# Patient Record
Sex: FEMALE | Race: WHITE | NOT HISPANIC OR LATINO | Employment: OTHER | URBAN - NONMETROPOLITAN AREA
[De-identification: names, ages, dates, MRNs, and addresses within clinical notes are randomized per-mention and may not be internally consistent; named-entity substitution may affect disease eponyms.]

---

## 2022-02-24 DIAGNOSIS — R60.0 LOCALIZED EDEMA: ICD-10-CM

## 2022-02-24 DIAGNOSIS — R79.89 OTHER SPECIFIED ABNORMAL FINDINGS OF BLOOD CHEMISTRY: ICD-10-CM

## 2022-02-24 DIAGNOSIS — I83.813 VARICOSE VEINS OF BILATERAL LOWER EXTREMITIES WITH PAIN: ICD-10-CM

## 2025-03-01 ENCOUNTER — OFFICE VISIT (OUTPATIENT)
Dept: URGENT CARE | Facility: CLINIC | Age: 85
End: 2025-03-01
Payer: COMMERCIAL

## 2025-03-01 VITALS
BODY MASS INDEX: 26.68 KG/M2 | DIASTOLIC BLOOD PRESSURE: 86 MMHG | RESPIRATION RATE: 32 BRPM | OXYGEN SATURATION: 94 % | HEIGHT: 62 IN | SYSTOLIC BLOOD PRESSURE: 146 MMHG | HEART RATE: 104 BPM | WEIGHT: 145 LBS

## 2025-03-01 DIAGNOSIS — H57.89 EYE REDNESS: Primary | ICD-10-CM

## 2025-03-01 PROCEDURE — S9088 SERVICES PROVIDED IN URGENT: HCPCS

## 2025-03-01 PROCEDURE — 99213 OFFICE O/P EST LOW 20 MIN: CPT

## 2025-03-01 RX ORDER — ERYTHROMYCIN 5 MG/G
0.5 OINTMENT OPHTHALMIC EVERY 6 HOURS SCHEDULED
Qty: 28 G | Refills: 0 | Status: SHIPPED | OUTPATIENT
Start: 2025-03-01 | End: 2025-03-01

## 2025-03-01 RX ORDER — METHIMAZOLE 5 MG/1
0.5 TABLET ORAL DAILY
COMMUNITY
Start: 2025-02-03

## 2025-03-01 RX ORDER — LOSARTAN POTASSIUM 100 MG/1
1 TABLET ORAL DAILY
COMMUNITY
Start: 2024-12-19

## 2025-03-01 RX ORDER — ASPIRIN 81 MG/1
81 TABLET ORAL DAILY
COMMUNITY

## 2025-03-01 NOTE — PROGRESS NOTES
Name: Suly Hdz      : 1940      MRN: [id number]  Encounter Provider: DIOMEDES Kaur  Encounter Date: 3/1/2025   Encounter department: [facility] - [place], [place]  :  Assessment & Plan  Eye redness    Patient declining eye drops.  Discussed they can trial ophthalmic ointment but unsure if this will treat sympt.  F/u with PCP & eye doctor as needed.    The benefits, risks and alternatives to the treatment plan were discussed at this visit. Patient was advised of common adverse effects of any medical therapies prescribed. All questions were answered and discussed with the patient and any accompanying family members or caretakers.     Orders:    bacitracin-polymyxin b (POLYSPORIN) ophthalmic ointment; Administer to both eyes 4 (four) times a day for 7 days        History of Present Illness     Suly Hdz is a 84 y.o. female who presents with eye redness x 1 week  +drainage; no visual changes    [place] - accompanied by dghter  No recent illness  Denies eye pain    History obtained from: patient    Review of Systems   Constitutional:  Negative for chills, fatigue and fever.   HENT:  Negative for congestion, ear pain, facial swelling, hearing loss, rhinorrhea, sinus pressure, sneezing, sore throat and trouble swallowing.    Eyes:  Positive for discharge and redness. Negative for pain and visual disturbance.   Respiratory:  Negative for cough, chest tightness, shortness of breath and wheezing.    Cardiovascular:  Negative for chest pain and palpitations.   Gastrointestinal:  Negative for abdominal pain, diarrhea, nausea and vomiting.   Genitourinary:  Negative for dysuria, flank pain, hematuria and pelvic pain.   Musculoskeletal:  Negative for arthralgias, back pain and myalgias.   Skin:  Negative for color change and rash.   Neurological:  Negative for dizziness, seizures, syncope, weakness, light-headedness and headaches.   Psychiatric/Behavioral:  Negative for confusion,  "hallucinations and sleep disturbance. The patient is not nervous/anxious.    All other systems reviewed and are negative.    Medical History Reviewed by provider this encounter:  Allergies  Meds  Problems  Med Hx  Surg Hx  Fam Hx     .  Current Outpatient Medications on File Prior to Visit   Medication Sig Dispense Refill    aspirin (ECOTRIN LOW STRENGTH) 81 mg EC tablet Take 81 mg by mouth daily      Cholecalciferol 50 MCG (2000 UT) CAPS Take 2,000 Units by mouth daily      losartan (COZAAR) 100 MG tablet Take 1 tablet by mouth in the morning      methimazole (TAPAZOLE) 5 mg tablet Take 0.5 tablets by mouth daily       No current facility-administered medications on file prior to visit.      Social History     Tobacco Use    Smoking status: Former     Types: Cigarettes    Smokeless tobacco: Not on file   Vaping Use    Vaping status: Never Used   Substance and Sexual Activity    Alcohol use: Never    Drug use: Not on file    Sexual activity: Not on file        Objective   /86   Pulse 104   Resp (!) 32   Ht 5' 2\" (1.575 m)   Wt 65.8 kg (145 lb)   SpO2 94%   BMI 26.52 kg/m²      Physical Exam  Vitals and nursing note reviewed.   Constitutional:       General: She is not in acute distress.     Appearance: She is well-developed.   HENT:      Head: Normocephalic and atraumatic.      Right Ear: Hearing and tympanic membrane normal.      Left Ear: Hearing and tympanic membrane normal.      Nose: Nose normal.      Mouth/Throat:      Mouth: Mucous membranes are moist.      Dentition: Normal dentition.      Tongue: No lesions.      Pharynx: Oropharynx is clear. Uvula midline. No oropharyngeal exudate.      Tonsils: No tonsillar exudate.   Eyes:      Extraocular Movements: Extraocular movements intact.      Conjunctiva/sclera:      Right eye: Right conjunctiva is injected.      Left eye: Left conjunctiva is injected.      Comments: L>R   Neck:      Vascular: No carotid bruit or JVD.   Cardiovascular:      " Rate and Rhythm: Normal rate and regular rhythm.      Heart sounds: S1 normal and S2 normal. No murmur heard.  Pulmonary:      Effort: Pulmonary effort is normal. No tachypnea or respiratory distress.      Breath sounds: Normal breath sounds and air entry. No decreased breath sounds.   Chest:      Chest wall: No deformity or tenderness.   Abdominal:      General: Abdomen is flat. Bowel sounds are normal. There is no distension.      Palpations: Abdomen is soft.      Tenderness: There is no abdominal tenderness. There is no right CVA tenderness, left CVA tenderness or guarding.   Musculoskeletal:         General: No swelling.      Cervical back: Full passive range of motion without pain and neck supple.      Right lower leg: No edema.      Left lower leg: No edema.   Lymphadenopathy:      Cervical: No cervical adenopathy.   Skin:     General: Skin is warm and dry.      Capillary Refill: Capillary refill takes less than 2 seconds.      Findings: No rash.   Neurological:      Mental Status: She is alert and oriented to person, place, and time.      Cranial Nerves: Cranial nerves 2-12 are intact.      Sensory: Sensation is intact.      Motor: Motor function is intact.      Coordination: Coordination is intact.      Gait: Gait is intact.   Psychiatric:         Mood and Affect: Mood normal.         Behavior: Behavior is cooperative.

## 2025-07-17 ENCOUNTER — OFFICE VISIT (OUTPATIENT)
Dept: URGENT CARE | Facility: CLINIC | Age: 85
End: 2025-07-17
Payer: COMMERCIAL

## 2025-07-17 VITALS
TEMPERATURE: 98.2 F | SYSTOLIC BLOOD PRESSURE: 130 MMHG | BODY MASS INDEX: 26.68 KG/M2 | DIASTOLIC BLOOD PRESSURE: 80 MMHG | RESPIRATION RATE: 18 BRPM | WEIGHT: 145 LBS | HEIGHT: 62 IN | OXYGEN SATURATION: 92 % | HEART RATE: 102 BPM

## 2025-07-17 DIAGNOSIS — L03.115 CELLULITIS OF RIGHT LOWER EXTREMITY: Primary | ICD-10-CM

## 2025-07-17 PROCEDURE — 99212 OFFICE O/P EST SF 10 MIN: CPT | Performed by: PHYSICIAN ASSISTANT

## 2025-07-17 PROCEDURE — S9088 SERVICES PROVIDED IN URGENT: HCPCS | Performed by: PHYSICIAN ASSISTANT

## 2025-07-17 RX ORDER — CEFUROXIME AXETIL 250 MG/1
250 TABLET ORAL EVERY 12 HOURS SCHEDULED
Qty: 20 TABLET | Refills: 0 | Status: SHIPPED | OUTPATIENT
Start: 2025-07-17 | End: 2025-07-27

## 2025-07-17 RX ORDER — DOXYCYCLINE 100 MG/1
100 CAPSULE ORAL DAILY
Qty: 10 CAPSULE | Refills: 0 | Status: SHIPPED | OUTPATIENT
Start: 2025-07-17 | End: 2025-07-27

## 2025-07-17 RX ORDER — LOSARTAN POTASSIUM AND HYDROCHLOROTHIAZIDE 12.5; 5 MG/1; MG/1
1 TABLET ORAL DAILY
COMMUNITY
Start: 2025-06-19

## 2025-07-17 NOTE — PROGRESS NOTES
[facility]  Name: Suly Hdz      : 1940      MRN: [id number]  Encounter Provider: Waleska Zamora PA-C  Encounter Date: 2025   Encounter department: [facility] [place]  :  Assessment & Plan  Cellulitis of right lower extremity    Orders:    cefuroxime (CEFTIN) 250 mg tablet; Take 1 tablet (250 mg total) by mouth every 12 (twelve) hours for 10 days    doxycycline monohydrate (MONODOX) 100 mg capsule; Take 1 capsule (100 mg total) by mouth in the morning for 10 doses    Advised patient to take the antibiotics as prescribed and proceed to the emergency if symptoms gotten worse.  Patient and her daughter verbalized understanding.    Patient Instructions  Take antibiotics as prescribed  Keep the wound dry and clean  Watch for worsening symptoms  Follow up with PCP in 3-5 days.  Proceed to  ER if symptoms worsen.    If tests are performed, our office will contact you with results only if changes need to made to the care plan discussed with you at the visit. You can review your full results on [facility].    Chief Complaint:   Chief Complaint   Patient presents with    Leg Pain     Pt c/o right leg pain x2 months. Pt was seen in the Er for this and given antibiotics . Pt thinks she has cellulitis.      History of Present Illness   Patient presents with right leg pain ongoing for approximately 2 months.  She was evaluated in the emergency department on 2025 for the same complaint and was described a course of antibiotics at that time.  She reports persistent discomfort despite prior treatment.  Denies any recent trauma, fever or drainage.  No significant improvement in symptoms noted since the ER visit.      History obtained from: patient's Significant Other    Review of Systems   Skin:  Positive for wound.     Past Medical History   Past Medical History[1]  Past Surgical History[2]  Family History[3]  she reports that she has quit smoking. Her smoking  "use included cigarettes. She has never used smokeless tobacco. She reports that she does not drink alcohol and does not use drugs.  Current Outpatient Medications   Medication Instructions    aspirin (ECOTRIN LOW STRENGTH) 81 mg, Daily    cefuroxime (CEFTIN) 250 mg, Oral, Every 12 hours scheduled    Cholecalciferol 2,000 Units, Oral, Daily    doxycycline monohydrate (MONODOX) 100 mg, Oral, Daily    losartan (COZAAR) 100 MG tablet 1 tablet, Oral, Daily    losartan-hydrochlorothiazide (HYZAAR) 50-12.5 mg per tablet 1 tablet, Daily    methimazole (TAPAZOLE) 5 mg tablet 0.5 tablets, Oral, Daily   Allergies[4]     Objective   /80   Pulse 102   Temp 98.2 °F (36.8 °C)   Resp 18   Ht 5' 2\" (1.575 m)   Wt 65.8 kg (145 lb)   SpO2 92%   BMI 26.52 kg/m²      Physical Exam  Vitals and nursing note reviewed.   Constitutional:   General: She is awake. She is not in acute distress.  Appearance: She is overweight. She is not ill-appearing, toxic-appearing or diaphoretic.   HENT:   Head: Normocephalic and atraumatic.   Mouth/Throat:   Mouth: Mucous membranes are moist.   Eyes:   General: Lids are normal. Vision grossly intact.   Extraocular Movements: Extraocular movements intact.   Conjunctiva/sclera: Conjunctivae normal.   Pupils: Pupils are equal, round, and reactive to light.   Cardiovascular:   Rate and Rhythm: Regular rhythm. Tachycardia present.   Pulmonary:   Effort: Pulmonary effort is normal. No accessory muscle usage.   Breath sounds: Normal breath sounds and air entry.   Abdominal:   Palpations: Abdomen is soft.   Musculoskeletal:   General: Swelling present.   Cervical back: Full passive range of motion without pain, normal range of motion and neck supple.   Skin:  General: Skin is warm.   Capillary Refill: Capillary refill takes less than 2 seconds.       Comments: Clear weeping ulcer anterior RLE with general redness in lower legs R>L. Distally warm pink and well perfused in feet     Administrative " "Statements   I have spent a total time of 10 minutes in caring for this patient on the day of the visit/encounter including Prognosis, Risks and benefits of tx options, Patient and family education, Importance of tx compliance, Documenting in the medical record, Reviewing/placing orders in the medical record (including tests, medications, and/or procedures), and Obtaining or reviewing history  .Portions of the record may have been created with voice recognition software.  Occasional wrong word or \"sound a like\" substitutions may have occurred due to the inherent limitations of voice recognition software.  Read the chart carefully and recognize, using context, where substitutions have occurred.       [1]   Past Medical History:  Diagnosis Date    COPD (chronic obstructive pulmonary disease) (HCC)     Hypertension    [2]   Past Surgical History:  Procedure Laterality Date    NO PAST SURGERIES     [3] No family history on file.  [4]   Allergies  Allergen Reactions    Clindamycin Other (See Comments)     Sore tongue, burning sensation down her throat     "